# Patient Record
Sex: MALE | Race: WHITE | Employment: UNEMPLOYED | URBAN - METROPOLITAN AREA
[De-identification: names, ages, dates, MRNs, and addresses within clinical notes are randomized per-mention and may not be internally consistent; named-entity substitution may affect disease eponyms.]

---

## 2024-07-11 ENCOUNTER — TELEPHONE (OUTPATIENT)
Dept: PRIMARY CARE CLINIC | Age: 59
End: 2024-07-11

## 2024-07-11 NOTE — TELEPHONE ENCOUNTER
----- Message from Jose Ellis sent at 7/10/2024  2:49 PM EDT -----  Regarding: [facility] Appointment Request  [facility] Appointment Request    Patient needs appointment for [facility] Appointment Type: New Patient.    Patient Requested Dates(s): As soon as possible  Patient Requested Time: Anytime  Provider Name: Doroteo Hernandez    Reason for Appointment Request: New Patient - No appointments available during search  --------------------------------------------------------------------------------------------------------------------------    Relationship to Patient: Self     Call Back Information: OK to leave message on voicemail  Preferred Call Back Number: Phone There are no phone numbers on file.

## 2024-09-12 ENCOUNTER — OFFICE VISIT (OUTPATIENT)
Dept: PRIMARY CARE CLINIC | Age: 59
End: 2024-09-12

## 2024-09-12 VITALS
DIASTOLIC BLOOD PRESSURE: 72 MMHG | HEIGHT: 69 IN | TEMPERATURE: 97.8 F | OXYGEN SATURATION: 97 % | HEART RATE: 56 BPM | BODY MASS INDEX: 24.65 KG/M2 | WEIGHT: 166.4 LBS | SYSTOLIC BLOOD PRESSURE: 120 MMHG

## 2024-09-12 DIAGNOSIS — Z76.89 ENCOUNTER TO ESTABLISH CARE: ICD-10-CM

## 2024-09-12 DIAGNOSIS — Z12.5 SCREENING FOR PROSTATE CANCER: ICD-10-CM

## 2024-09-12 DIAGNOSIS — B37.9 YEAST INFECTION: Primary | ICD-10-CM

## 2024-09-12 LAB
BASOPHILS ABSOLUTE: 0.06 K/UL (ref 0–0.2)
BASOPHILS RELATIVE PERCENT: 1 % (ref 0–2)
EOSINOPHILS ABSOLUTE: 0.18 K/UL (ref 0.05–0.5)
EOSINOPHILS RELATIVE PERCENT: 4 % (ref 0–6)
HCT VFR BLD CALC: 51 % (ref 37–54)
HEMOGLOBIN: 16.9 G/DL (ref 12.5–16.5)
IMMATURE GRANULOCYTES %: 0 % (ref 0–5)
IMMATURE GRANULOCYTES ABSOLUTE: <0.03 K/UL (ref 0–0.58)
LYMPHOCYTES ABSOLUTE: 1.31 K/UL (ref 1.5–4)
LYMPHOCYTES RELATIVE PERCENT: 29 % (ref 20–42)
MCH RBC QN AUTO: 32.8 PG (ref 26–35)
MCHC RBC AUTO-ENTMCNC: 33.1 G/DL (ref 32–34.5)
MCV RBC AUTO: 98.8 FL (ref 80–99.9)
MONOCYTES ABSOLUTE: 0.45 K/UL (ref 0.1–0.95)
MONOCYTES RELATIVE PERCENT: 10 % (ref 2–12)
NEUTROPHILS ABSOLUTE: 2.53 K/UL (ref 1.8–7.3)
NEUTROPHILS RELATIVE PERCENT: 56 % (ref 43–80)
PDW BLD-RTO: 12.3 % (ref 11.5–15)
PLATELET # BLD: 186 K/UL (ref 130–450)
PMV BLD AUTO: 10.7 FL (ref 7–12)
RBC # BLD: 5.16 M/UL (ref 3.8–5.8)
WBC # BLD: 4.5 K/UL (ref 4.5–11.5)

## 2024-09-12 RX ORDER — FLUCONAZOLE 150 MG/1
150 TABLET ORAL ONCE
Qty: 1 TABLET | Refills: 0 | Status: SHIPPED | OUTPATIENT
Start: 2024-09-12 | End: 2024-09-12

## 2024-09-12 RX ORDER — NYSTATIN 100000 [USP'U]/G
POWDER TOPICAL
Qty: 60 G | Refills: 4 | Status: SHIPPED | OUTPATIENT
Start: 2024-09-12

## 2024-09-12 SDOH — ECONOMIC STABILITY: FOOD INSECURITY: WITHIN THE PAST 12 MONTHS, YOU WORRIED THAT YOUR FOOD WOULD RUN OUT BEFORE YOU GOT MONEY TO BUY MORE.: NEVER TRUE

## 2024-09-12 SDOH — ECONOMIC STABILITY: FOOD INSECURITY: WITHIN THE PAST 12 MONTHS, THE FOOD YOU BOUGHT JUST DIDN'T LAST AND YOU DIDN'T HAVE MONEY TO GET MORE.: NEVER TRUE

## 2024-09-12 SDOH — ECONOMIC STABILITY: INCOME INSECURITY: HOW HARD IS IT FOR YOU TO PAY FOR THE VERY BASICS LIKE FOOD, HOUSING, MEDICAL CARE, AND HEATING?: NOT HARD AT ALL

## 2024-09-12 ASSESSMENT — ENCOUNTER SYMPTOMS
COUGH: 0
CONSTIPATION: 0
TROUBLE SWALLOWING: 0
EYE REDNESS: 0
ABDOMINAL PAIN: 0
VOMITING: 0
NAUSEA: 0
PHOTOPHOBIA: 0
FACIAL SWELLING: 0
DIARRHEA: 0
BACK PAIN: 0
SINUS PRESSURE: 0
SORE THROAT: 0
EYE DISCHARGE: 0
VOICE CHANGE: 0
SINUS PAIN: 0
BLOOD IN STOOL: 0
ABDOMINAL DISTENTION: 0
WHEEZING: 0
COLOR CHANGE: 0
EYE ITCHING: 0
SHORTNESS OF BREATH: 0
EYE PAIN: 0
CHEST TIGHTNESS: 0
CHOKING: 0
RHINORRHEA: 1

## 2024-09-12 ASSESSMENT — PATIENT HEALTH QUESTIONNAIRE - PHQ9
2. FEELING DOWN, DEPRESSED OR HOPELESS: NOT AT ALL
SUM OF ALL RESPONSES TO PHQ QUESTIONS 1-9: 0
1. LITTLE INTEREST OR PLEASURE IN DOING THINGS: NOT AT ALL
SUM OF ALL RESPONSES TO PHQ QUESTIONS 1-9: 0
SUM OF ALL RESPONSES TO PHQ9 QUESTIONS 1 & 2: 0

## 2024-09-12 ASSESSMENT — VISUAL ACUITY: OU: 1

## 2024-09-13 LAB
ALBUMIN: 4 G/DL (ref 3.5–5.2)
ALP BLD-CCNC: 108 U/L (ref 40–129)
ALT SERPL-CCNC: 33 U/L (ref 0–40)
ANION GAP SERPL CALCULATED.3IONS-SCNC: 12 MMOL/L (ref 7–16)
AST SERPL-CCNC: 34 U/L (ref 0–39)
BILIRUB SERPL-MCNC: 0.9 MG/DL (ref 0–1.2)
BUN BLDV-MCNC: 13 MG/DL (ref 6–23)
CALCIUM SERPL-MCNC: 9.3 MG/DL (ref 8.6–10.2)
CHLORIDE BLD-SCNC: 106 MMOL/L (ref 98–107)
CHOLESTEROL, TOTAL: 132 MG/DL
CO2: 27 MMOL/L (ref 22–29)
CREAT SERPL-MCNC: 1.1 MG/DL (ref 0.7–1.2)
GFR, ESTIMATED: 79 ML/MIN/1.73M2
GLUCOSE BLD-MCNC: 86 MG/DL (ref 74–99)
HDLC SERPL-MCNC: 57 MG/DL
LDL CHOLESTEROL: 65 MG/DL
POTASSIUM SERPL-SCNC: 4.9 MMOL/L (ref 3.5–5)
PROSTATE SPECIFIC ANTIGEN: 0.42 NG/ML (ref 0–4)
SODIUM BLD-SCNC: 145 MMOL/L (ref 132–146)
TOTAL PROTEIN: 6.6 G/DL (ref 6.4–8.3)
TRIGL SERPL-MCNC: 51 MG/DL
TSH SERPL DL<=0.05 MIU/L-ACNC: 0.72 UIU/ML (ref 0.27–4.2)
VLDLC SERPL CALC-MCNC: 10 MG/DL

## 2024-10-17 ENCOUNTER — OFFICE VISIT (OUTPATIENT)
Dept: PRIMARY CARE CLINIC | Age: 59
End: 2024-10-17

## 2024-10-17 VITALS
RESPIRATION RATE: 17 BRPM | BODY MASS INDEX: 24.44 KG/M2 | TEMPERATURE: 97 F | DIASTOLIC BLOOD PRESSURE: 80 MMHG | OXYGEN SATURATION: 98 % | HEART RATE: 61 BPM | SYSTOLIC BLOOD PRESSURE: 122 MMHG | HEIGHT: 69 IN | WEIGHT: 165 LBS

## 2024-10-17 ASSESSMENT — ENCOUNTER SYMPTOMS
EYE DISCHARGE: 0
CHOKING: 0
BLOOD IN STOOL: 0
CONSTIPATION: 0
DIARRHEA: 0
RHINORRHEA: 0
SINUS PAIN: 0
ABDOMINAL DISTENTION: 0
NAUSEA: 0
TROUBLE SWALLOWING: 0
SINUS PRESSURE: 0
EYE PAIN: 0
EYE ITCHING: 0
SORE THROAT: 0
ABDOMINAL PAIN: 0
FACIAL SWELLING: 0
SHORTNESS OF BREATH: 0
WHEEZING: 0
EYE REDNESS: 0
VOMITING: 0
COLOR CHANGE: 0
CHEST TIGHTNESS: 0
BACK PAIN: 0
PHOTOPHOBIA: 0
COUGH: 0
VOICE CHANGE: 0

## 2024-10-17 ASSESSMENT — VISUAL ACUITY: OU: 1

## 2024-10-17 NOTE — PROGRESS NOTES
10/17/24  Cade Mcdonald : 1965 Sex: male  Age: 59 y.o.    Chief Complaint   Patient presents with    1 Month Follow-Up    Discuss Labs    colonoscopy      1.5 year ago with dr Gatito Duff M.D. I will call and get records     Flu Vaccine     Refused flu vac        Cade is seen today for a follow-up and a review of his overall condition and plan of care.  He is also here to discuss lab work, that he has had recently drawn.  His blood work looks very well today and there was nothing noted to be abnormal.  He states that he is continuing to swim regularly and sticking with his vegan diet.  He reports that he had been having some left, lateral hip pain, but states it is \"not that often\".  Lastly, he has still been seeing his counselor and states that his depression and anxiety are controlled.  He denies any acute confusion, forgetfulness or any change in cognition.        Review of Systems   Constitutional:  Negative for appetite change, chills, diaphoresis, fatigue, fever and unexpected weight change.   HENT:  Negative for congestion, ear pain, facial swelling, hearing loss, nosebleeds, postnasal drip, rhinorrhea, sinus pressure, sinus pain, sneezing, sore throat, tinnitus, trouble swallowing and voice change.    Eyes:  Negative for photophobia, pain, discharge, redness and itching.   Respiratory:  Negative for cough, choking, chest tightness, shortness of breath and wheezing.    Cardiovascular:  Negative for chest pain, palpitations and leg swelling.   Gastrointestinal:  Negative for abdominal distention, abdominal pain, blood in stool, constipation, diarrhea, nausea and vomiting.   Endocrine: Negative for cold intolerance, heat intolerance, polydipsia, polyphagia and polyuria.   Genitourinary:  Negative for difficulty urinating, dysuria, flank pain, frequency, hematuria and urgency.   Musculoskeletal:  Negative for arthralgias (Left, lateral hip \"not that often\"), back pain, gait problem, joint